# Patient Record
Sex: FEMALE | Race: AMERICAN INDIAN OR ALASKA NATIVE | NOT HISPANIC OR LATINO | URBAN - METROPOLITAN AREA
[De-identification: names, ages, dates, MRNs, and addresses within clinical notes are randomized per-mention and may not be internally consistent; named-entity substitution may affect disease eponyms.]

---

## 2017-03-30 ENCOUNTER — APPOINTMENT (RX ONLY)
Dept: URBAN - METROPOLITAN AREA CLINIC 171 | Facility: CLINIC | Age: 67
Setting detail: DERMATOLOGY
End: 2017-03-30

## 2017-03-30 DIAGNOSIS — L29.9 PRURITUS, UNSPECIFIED: ICD-10-CM

## 2017-03-30 DIAGNOSIS — L21.8 OTHER SEBORRHEIC DERMATITIS: ICD-10-CM

## 2017-03-30 DIAGNOSIS — C84.0 MYCOSIS FUNGOIDES: ICD-10-CM | Status: RESOLVED

## 2017-03-30 PROBLEM — J44.9 CHRONIC OBSTRUCTIVE PULMONARY DISEASE, UNSPECIFIED: Status: ACTIVE | Noted: 2017-03-30

## 2017-03-30 PROBLEM — K21.9 GASTRO-ESOPHAGEAL REFLUX DISEASE WITHOUT ESOPHAGITIS: Status: ACTIVE | Noted: 2017-03-30

## 2017-03-30 PROBLEM — C84.00 MYCOSIS FUNGOIDES, UNSPECIFIED SITE: Status: ACTIVE | Noted: 2017-03-30

## 2017-03-30 PROBLEM — J30.1 ALLERGIC RHINITIS DUE TO POLLEN: Status: ACTIVE | Noted: 2017-03-30

## 2017-03-30 PROBLEM — E13.9 OTHER SPECIFIED DIABETES MELLITUS WITHOUT COMPLICATIONS: Status: ACTIVE | Noted: 2017-03-30

## 2017-03-30 PROCEDURE — ? OTHER

## 2017-03-30 PROCEDURE — ? PRESCRIPTION

## 2017-03-30 PROCEDURE — ? TREATMENT REGIMEN

## 2017-03-30 PROCEDURE — ? PATIENT SPECIFIC COUNSELING

## 2017-03-30 PROCEDURE — ? COUNSELING

## 2017-03-30 PROCEDURE — 99213 OFFICE O/P EST LOW 20 MIN: CPT

## 2017-03-30 RX ORDER — GABAPENTIN 300 MG/1
1 CAPSULE ORAL
Qty: 90 | Refills: 0 | Status: ERX

## 2017-03-30 RX ORDER — BETAMETHASONE DIPROPIONATE 0.5 MG/G
APPLY LOTION TOPICAL
Qty: 1 | Refills: 1 | Status: ERX | COMMUNITY
Start: 2017-03-30

## 2017-03-30 RX ADMIN — BETAMETHASONE DIPROPIONATE APPLY: 0.5 LOTION TOPICAL at 18:46

## 2017-03-30 ASSESSMENT — LOCATION DETAILED DESCRIPTION DERM
LOCATION DETAILED: RIGHT MEDIAL BREAST 4-5:00 REGION
LOCATION DETAILED: LEFT MEDIAL BREAST 6-7:00 REGION
LOCATION DETAILED: MID-OCCIPITAL SCALP
LOCATION DETAILED: LEFT PERIAREOLAR BREAST 7-8:00 REGION
LOCATION DETAILED: LEFT POSTERIOR NECK

## 2017-03-30 ASSESSMENT — LOCATION ZONE DERM
LOCATION ZONE: TRUNK
LOCATION ZONE: TRUNK
LOCATION ZONE: NECK
LOCATION ZONE: SCALP

## 2017-03-30 ASSESSMENT — LOCATION SIMPLE DESCRIPTION DERM
LOCATION SIMPLE: LEFT BREAST
LOCATION SIMPLE: RIGHT BREAST
LOCATION SIMPLE: LEFT BREAST
LOCATION SIMPLE: POSTERIOR NECK
LOCATION SIMPLE: POSTERIOR SCALP

## 2017-03-30 ASSESSMENT — SEVERITY ASSESSMENT: SEVERITY: CLEAR

## 2017-03-30 NOTE — PROCEDURE: PATIENT SPECIFIC COUNSELING
Detail Level: Simple
Pt has residual itch after having radiation therapy. Pt advised to try cerave anti itch cream daily\\nUse ice packs/cold packs for itch if severe
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  Often pruritus lasts well beyond the obvious rash.  I discussed with patient by phone the option of NBUVB in office light treatments 3 times per week for 2 month vs Gabapentin 300mg 1 pill at bedtime x 2 months.  Patient would like to start the Gabapentin first.

## 2017-03-30 NOTE — PROCEDURE: OTHER
Detail Level: Simple
Other (Free Text): I also recommended patient get a screening mammogram to ensure there is nothing deep in the breast tissue causing the itching.  No evidence of residual or recurring mycososis fungoides.
Note Text (......Xxx Chief Complaint.): This diagnosis correlates with the

## 2017-03-30 NOTE — PROCEDURE: TREATMENT REGIMEN
Initiate Treatment: Betamethasone lotion apply to scalp daily x2 weeks as needed for flares
Detail Level: Zone